# Patient Record
Sex: MALE | Race: WHITE | NOT HISPANIC OR LATINO
[De-identification: names, ages, dates, MRNs, and addresses within clinical notes are randomized per-mention and may not be internally consistent; named-entity substitution may affect disease eponyms.]

---

## 2023-01-18 PROBLEM — Z00.00 ENCOUNTER FOR PREVENTIVE HEALTH EXAMINATION: Status: ACTIVE | Noted: 2023-01-18

## 2023-02-06 ENCOUNTER — NON-APPOINTMENT (OUTPATIENT)
Age: 28
End: 2023-02-06

## 2023-02-06 ENCOUNTER — APPOINTMENT (OUTPATIENT)
Dept: NEPHROLOGY | Facility: CLINIC | Age: 28
End: 2023-02-06
Payer: COMMERCIAL

## 2023-02-06 VITALS — DIASTOLIC BLOOD PRESSURE: 83 MMHG | HEART RATE: 82 BPM | SYSTOLIC BLOOD PRESSURE: 154 MMHG

## 2023-02-06 VITALS — DIASTOLIC BLOOD PRESSURE: 82 MMHG | SYSTOLIC BLOOD PRESSURE: 156 MMHG | HEART RATE: 82 BPM

## 2023-02-06 DIAGNOSIS — Z78.9 OTHER SPECIFIED HEALTH STATUS: ICD-10-CM

## 2023-02-06 DIAGNOSIS — Z82.49 FAMILY HISTORY OF ISCHEMIC HEART DISEASE AND OTHER DISEASES OF THE CIRCULATORY SYSTEM: ICD-10-CM

## 2023-02-06 DIAGNOSIS — Z80.8 FAMILY HISTORY OF MALIGNANT NEOPLASM OF OTHER ORGANS OR SYSTEMS: ICD-10-CM

## 2023-02-06 PROCEDURE — 99204 OFFICE O/P NEW MOD 45 MIN: CPT

## 2023-02-06 RX ORDER — AMLODIPINE BESYLATE 10 MG/1
10 TABLET ORAL
Refills: 0 | Status: ACTIVE | COMMUNITY

## 2023-02-06 RX ORDER — BENAZEPRIL HYDROCHLORIDE 40 MG/1
40 TABLET, FILM COATED ORAL
Refills: 0 | Status: ACTIVE | COMMUNITY

## 2023-02-06 NOTE — CONSULT LETTER
[Dear  ___] : Dear ~ELLEN, [Consult Letter:] : I had the pleasure of evaluating your patient, [unfilled]. [Please see my note below.] : Please see my note below. [Consult Closing:] : Thank you very much for allowing me to participate in the care of this patient.  If you have any questions, please do not hesitate to contact me. [FreeTextEntry2] : Morales Queen MD\par [address]\par [place], [place] [address]\par  [FreeTextEntry1] : His BP was 156/82 mmHg and his exam was otherwise unremarkable. He  will complete a ABPM and go for labs to to exclude secondary hypertension. I will keep you apprised of my findings.  [FreeTextEntry3] : Best personal regards,\par Lana\par \par Lana Kaur MD, FACP\par Professor of Medicine\par [facility] [employer] at [facility]\par \par

## 2023-02-06 NOTE — ASSESSMENT
[FreeTextEntry1] : all lab data was reviewed with patient in detail from 10/12/2022\par 28 yo man with HTN\par HTN- BP presently uncontrolled\par home BP monitoring- reviewed home BP monitoring technique: seated position, arm supported on table- 3 measurements 2-5 minutes apart- record lowest BP \par arrange for ABPM\par r/o 2nd htn- labs ordered\par \par f/u monthly x 3 months

## 2023-02-06 NOTE — HISTORY OF PRESENT ILLNESS
[FreeTextEntry1] : 26 yo man here for further evaluation and management of HTN\par initially found to be HTN at age 14- found incidentally  when went to an urgent center for unrelated problem.\par Started on medications at that time. - inconsistent with PCP follow up, but has been able to keep up his medications\par No secondary HTN evaluation ever completed.\par No NSAIDs- \par ASA  couple times monthly for muscle aches and pains or stiff neck\par has anxiety; thinks he may have ADHD but no formal testing to date\par weight stable over the past 4 years- was up at beginning of COVID, now back to prior \par exercises cardio 5-7 x weekly\par

## 2023-02-07 LAB
CREAT SPEC-SCNC: 131 MG/DL
MICROALBUMIN 24H UR DL<=1MG/L-MCNC: <1.2 MG/DL
MICROALBUMIN/CREAT 24H UR-RTO: NORMAL MG/G

## 2023-02-11 LAB
APPEARANCE: CLEAR
BACTERIA: NEGATIVE
BILIRUBIN URINE: NEGATIVE
BLOOD URINE: NEGATIVE
COLOR: NORMAL
GLUCOSE QUALITATIVE U: NEGATIVE
HYALINE CASTS: 0 /LPF
KETONES URINE: NEGATIVE
LEUKOCYTE ESTERASE URINE: NEGATIVE
MICROSCOPIC-UA: NORMAL
NITRITE URINE: NEGATIVE
PH URINE: 6.5
PROTEIN URINE: NEGATIVE
RED BLOOD CELLS URINE: 0 /HPF
SPECIFIC GRAVITY URINE: 1.01
SQUAMOUS EPITHELIAL CELLS: 0 /HPF
UROBILINOGEN URINE: NORMAL
WHITE BLOOD CELLS URINE: 1 /HPF

## 2023-02-21 ENCOUNTER — APPOINTMENT (OUTPATIENT)
Dept: NEPHROLOGY | Facility: CLINIC | Age: 28
End: 2023-02-21
Payer: COMMERCIAL

## 2023-02-21 VITALS — HEART RATE: 83 BPM | DIASTOLIC BLOOD PRESSURE: 78 MMHG | SYSTOLIC BLOOD PRESSURE: 142 MMHG

## 2023-02-21 PROCEDURE — 93784 AMBL BP MNTR W/SOFTWARE: CPT

## 2023-02-21 NOTE — PROCEDURE
[FreeTextEntry1] : Placed ABPM cuff on left upper arm. Gave instructions for device function and proper fit.\par approx sleep period: varies ~ 10p - 7a\par current BP meds: amlodipine 10mg, benazepril 40mg

## 2023-03-07 ENCOUNTER — NON-APPOINTMENT (OUTPATIENT)
Age: 28
End: 2023-03-07

## 2023-03-15 ENCOUNTER — APPOINTMENT (OUTPATIENT)
Dept: NEPHROLOGY | Facility: CLINIC | Age: 28
End: 2023-03-15
Payer: COMMERCIAL

## 2023-03-15 VITALS — WEIGHT: 216 LBS | HEART RATE: 80 BPM | SYSTOLIC BLOOD PRESSURE: 140 MMHG | DIASTOLIC BLOOD PRESSURE: 74 MMHG

## 2023-03-15 LAB
ALDOSTERONE SERUM: 11.1 NG/DL
ANION GAP SERPL CALC-SCNC: 12 MMOL/L
BUN SERPL-MCNC: 13 MG/DL
CALCIUM SERPL-MCNC: 9.8 MG/DL
CHLORIDE SERPL-SCNC: 105 MMOL/L
CO2 SERPL-SCNC: 23 MMOL/L
CORTIS SERPL-MCNC: 15.2 UG/DL
CREAT SERPL-MCNC: 0.84 MG/DL
DOPAMINE UR-MCNC: <30 PG/ML
EGFR: 121 ML/MIN/1.73M2
EPINEPH UR-MCNC: 90 PG/ML
GLUCOSE SERPL-MCNC: 96 MG/DL
METANEPHRINE, PL: <10 PG/ML
NOREPINEPH UR-MCNC: 305 PG/ML
NORMETANEPHRINE, PL: 18.8 PG/ML
POTASSIUM SERPL-SCNC: 4.6 MMOL/L
SODIUM SERPL-SCNC: 141 MMOL/L
TSH SERPL-ACNC: 1.18 UIU/ML

## 2023-03-15 PROCEDURE — 99213 OFFICE O/P EST LOW 20 MIN: CPT

## 2023-03-15 NOTE — PHYSICAL EXAM
[General Appearance - Alert] : alert [General Appearance - In No Acute Distress] : in no acute distress [] : no respiratory distress [Auscultation Breath Sounds / Voice Sounds] : lungs were clear to auscultation bilaterally [Heart Rate And Rhythm] : heart rate was normal and rhythm regular [Heart Sounds] : normal S1 and S2 [Murmurs] : no murmurs [Heart Sounds Gallop] : no gallops [Heart Sounds Pericardial Friction Rub] : no pericardial rub [Edema] : there was no peripheral edema [No CVA Tenderness] : no ~M costovertebral angle tenderness [Oriented To Time, Place, And Person] : oriented to person, place, and time [Impaired Insight] : insight and judgment were intact [Affect] : the affect was normal

## 2023-03-19 LAB — RENIN ACTIVITY, PLASMA: 2.77 NG/ML/HR

## 2023-03-19 NOTE — HISTORY OF PRESENT ILLNESS
[FreeTextEntry1] : 26 yo man with HTN, here for follow up evaluation\par ABPM with /68  with acceptable dipping\par some readings above target\par No NSAIDs- \par Denies flank pain, dysuria, hematuria or frothy urine \par \par \par PMH:\par initially found to be HTN at age 14- found incidentally  when went to an urgent center for unrelated problem.\par Started on medications at that time. - inconsistent with PCP follow up, but has been able to keep up his medications\par No secondary HTN evaluation ever completed\par has anxiety; thinks he may have ADHD but no formal testing to date\par exercises cardio 5-7 x weekly\par

## 2023-04-12 ENCOUNTER — APPOINTMENT (OUTPATIENT)
Dept: NEPHROLOGY | Facility: CLINIC | Age: 28
End: 2023-04-12
Payer: COMMERCIAL

## 2023-04-12 VITALS — SYSTOLIC BLOOD PRESSURE: 118 MMHG | DIASTOLIC BLOOD PRESSURE: 71 MMHG | OXYGEN SATURATION: 99 % | HEART RATE: 68 BPM

## 2023-04-12 DIAGNOSIS — R45.0 NERVOUSNESS: ICD-10-CM

## 2023-04-12 PROCEDURE — 99214 OFFICE O/P EST MOD 30 MIN: CPT

## 2023-04-12 NOTE — HISTORY OF PRESENT ILLNESS
[FreeTextEntry1] : 26 yo man here for f/u evaluation of HTN.  secondary causes excluded by lab data\par last OV, with some readings above target, added carvedilol 12.5 mg BID\par felt tired for first few days, but now tolerating well and notes that his palpitations and jitteriness less \par want sto start medication for his ADHD- \par No NSAIDs- \par Denies flank pain, dysuria, hematuria or frothy urine \par \par \par PMH:\par initially found to be HTN at age 14- found incidentally  when went to an urgent center for unrelated problem.\par Started on medications at that time. - inconsistent with PCP follow up, but has been able to keep up his medications\par No secondary HTN evaluation ever completed\par has anxiety; thinks he may have ADHD but no formal testing to date\par exercises cardio 5-7 x weekly\par \par 2/21/2023 ABPM: mean /68  with acceptable dipping\par some readings above target\par

## 2023-04-12 NOTE — ASSESSMENT
[FreeTextEntry1] : 28 yo man with HTN, ADHD and anxiety\par -HTN- BP controlled c/w present regimen benazepril and carvedilol \par would like to institute home BP monitoring at one point down the road once he can manage\par -jitteriness/palpitations - controlled with carvedilol c/w same \par -ADHD- okay to start medications from HTN/renal perspective- he will f/u with psych\par \par \par does not need his next monthly visit- f/u 4 months

## 2023-08-14 ENCOUNTER — APPOINTMENT (OUTPATIENT)
Dept: NEPHROLOGY | Facility: CLINIC | Age: 28
End: 2023-08-14
Payer: COMMERCIAL

## 2023-08-14 VITALS — HEART RATE: 70 BPM | DIASTOLIC BLOOD PRESSURE: 70 MMHG | SYSTOLIC BLOOD PRESSURE: 120 MMHG

## 2023-08-14 DIAGNOSIS — F41.9 ANXIETY DISORDER, UNSPECIFIED: ICD-10-CM

## 2023-08-14 DIAGNOSIS — F90.9 ATTENTION-DEFICIT HYPERACTIVITY DISORDER, UNSPECIFIED TYPE: ICD-10-CM

## 2023-08-14 DIAGNOSIS — I10 ESSENTIAL (PRIMARY) HYPERTENSION: ICD-10-CM

## 2023-08-14 PROCEDURE — 99214 OFFICE O/P EST MOD 30 MIN: CPT

## 2023-08-14 RX ORDER — ATOMOXETINE HYDROCHLORIDE 40 MG/1
40 CAPSULE ORAL
Refills: 0 | Status: ACTIVE | COMMUNITY

## 2023-08-14 RX ORDER — CARVEDILOL 12.5 MG/1
12.5 TABLET, FILM COATED ORAL
Qty: 180 | Refills: 3 | Status: ACTIVE | COMMUNITY
Start: 2023-03-15

## 2023-08-14 NOTE — ASSESSMENT
[FreeTextEntry1] : 26 yo man with HTN, ADHD and anxiety BP controlled okay to add ADHD meds as needed with close BP follow up. advised that we might need to increase antihypertensive regimen as ADHD meds adjusted. reviewed home BP monitoring technique: seated position, arm supported on table- 3 measurements 2-5 minutes apart- record lowest BP -instructed to measure BP 1 week each month- same relative week and enmail measurements to office each month  f/u 4-6 months

## 2023-08-14 NOTE — HISTORY OF PRESENT ILLNESS
[FreeTextEntry1] : 26 yo man with HTN, ADHD here for follow up evaluation.  secondary causes excluded by lab data using carvedilol 12.5 mg 1/2 tab BID now on Strattera- life changing-  No NSAIDs-  Denies flank pain, dysuria, hematuria or frothy urine    PMH: initially found to be HTN at age 14- found incidentally  when went to an urgent center for unrelated problem. Started on medications at that time. - inconsistent with PCP follow up, but has been able to keep up his medications No secondary HTN evaluation ever completed has anxiety; thinks he may have ADHD but no formal testing to date exercises cardio 5-7 x weekly  2/21/2023 ABPM: mean /68  with acceptable dipping some readings above target

## 2024-02-06 ENCOUNTER — APPOINTMENT (OUTPATIENT)
Dept: NEPHROLOGY | Facility: CLINIC | Age: 29
End: 2024-02-06